# Patient Record
Sex: MALE | Race: WHITE | NOT HISPANIC OR LATINO | Employment: PART TIME | URBAN - NONMETROPOLITAN AREA
[De-identification: names, ages, dates, MRNs, and addresses within clinical notes are randomized per-mention and may not be internally consistent; named-entity substitution may affect disease eponyms.]

---
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
## 2021-01-06 DIAGNOSIS — F90.9 ATTENTION DEFICIT HYPERACTIVITY DISORDER (ADHD), UNSPECIFIED ADHD TYPE: Primary | ICD-10-CM

## 2021-01-06 RX ORDER — DEXTROAMPHETAMINE SACCHARATE, AMPHETAMINE ASPARTATE, DEXTROAMPHETAMINE SULFATE AND AMPHETAMINE SULFATE 2.5; 2.5; 2.5; 2.5 MG/1; MG/1; MG/1; MG/1
10 TABLET ORAL DAILY
Qty: 30 TABLET | Refills: 0 | Status: CANCELLED | OUTPATIENT
Start: 2021-01-06

## 2021-01-06 RX ORDER — DEXTROAMPHETAMINE SACCHARATE, AMPHETAMINE ASPARTATE, DEXTROAMPHETAMINE SULFATE AND AMPHETAMINE SULFATE 2.5; 2.5; 2.5; 2.5 MG/1; MG/1; MG/1; MG/1
10 TABLET ORAL DAILY
Qty: 30 TABLET | Refills: 0 | Status: SHIPPED | OUTPATIENT
Start: 2021-01-06 | End: 2021-01-11 | Stop reason: SDUPTHER

## 2021-01-06 RX ORDER — DEXTROAMPHETAMINE SACCHARATE, AMPHETAMINE ASPARTATE, DEXTROAMPHETAMINE SULFATE AND AMPHETAMINE SULFATE 2.5; 2.5; 2.5; 2.5 MG/1; MG/1; MG/1; MG/1
10 TABLET ORAL DAILY
COMMUNITY
End: 2021-01-06 | Stop reason: SDUPTHER

## 2021-01-06 NOTE — ADDENDUM NOTE
Addended by: MORALES VAZQUEZ on: 1/6/2021 04:56 PM     Modules accepted: Orders     This is a chronic health problem that is uncontrolled with current medications and lifestyle measures.  This patient had been working with the fertility doctors trying to achieve a pregnancy because her  has no children of his own and she has 2 daughters.  They tried 3 different times and were not able to achieve a pregnancy.  Patient is not planning to continue in that process.  We need to check her thyroid studies because it has been a year since she had her last ones done.  She is on levothyroxine 50 mcg daily.  We want to be certain this is adequate and controlling her thyroid replacement.

## 2021-01-11 DIAGNOSIS — F90.9 ATTENTION DEFICIT HYPERACTIVITY DISORDER (ADHD), UNSPECIFIED ADHD TYPE: ICD-10-CM

## 2021-01-11 RX ORDER — DEXTROAMPHETAMINE SACCHARATE, AMPHETAMINE ASPARTATE, DEXTROAMPHETAMINE SULFATE AND AMPHETAMINE SULFATE 2.5; 2.5; 2.5; 2.5 MG/1; MG/1; MG/1; MG/1
10 TABLET ORAL DAILY
Qty: 30 TABLET | Refills: 0 | Status: SHIPPED | OUTPATIENT
Start: 2021-01-11 | End: 2021-09-03

## 2021-02-05 DIAGNOSIS — K21.9 GASTRO-ESOPHAGEAL REFLUX DISEASE WITHOUT ESOPHAGITIS: ICD-10-CM

## 2021-02-05 RX ORDER — OMEPRAZOLE 20 MG/1
CAPSULE, DELAYED RELEASE ORAL
Qty: 30 CAPSULE | Refills: 2 | Status: SHIPPED | OUTPATIENT
Start: 2021-02-05 | End: 2021-05-24

## 2021-03-01 ENCOUNTER — TELEPHONE (OUTPATIENT)
Dept: FAMILY MEDICINE CLINIC | Facility: CLINIC | Age: 21
End: 2021-03-01

## 2021-03-01 ENCOUNTER — OFFICE VISIT (OUTPATIENT)
Dept: FAMILY MEDICINE CLINIC | Facility: CLINIC | Age: 21
End: 2021-03-01

## 2021-03-01 ENCOUNTER — LAB (OUTPATIENT)
Dept: LAB | Facility: HOSPITAL | Age: 21
End: 2021-03-01

## 2021-03-01 VITALS
SYSTOLIC BLOOD PRESSURE: 111 MMHG | BODY MASS INDEX: 22.51 KG/M2 | TEMPERATURE: 98 F | DIASTOLIC BLOOD PRESSURE: 80 MMHG | HEART RATE: 71 BPM | OXYGEN SATURATION: 98 % | WEIGHT: 152 LBS | HEIGHT: 69 IN

## 2021-03-01 DIAGNOSIS — Z79.899 MEDICATION MANAGEMENT: ICD-10-CM

## 2021-03-01 DIAGNOSIS — F90.2 ATTENTION DEFICIT HYPERACTIVITY DISORDER (ADHD), COMBINED TYPE: ICD-10-CM

## 2021-03-01 DIAGNOSIS — F90.2 ATTENTION DEFICIT HYPERACTIVITY DISORDER (ADHD), COMBINED TYPE: Primary | ICD-10-CM

## 2021-03-01 DIAGNOSIS — Z79.899 MEDICATION MANAGEMENT: Primary | ICD-10-CM

## 2021-03-01 DIAGNOSIS — F39 MOOD DISORDER (HCC): ICD-10-CM

## 2021-03-01 LAB
AMPHET+METHAMPHET UR QL: NEGATIVE
AMPHETAMINES UR QL: NEGATIVE
BARBITURATES UR QL SCN: NEGATIVE
BENZODIAZ UR QL SCN: NEGATIVE
BUPRENORPHINE SERPL-MCNC: NEGATIVE NG/ML
CANNABINOIDS SERPL QL: POSITIVE
COCAINE UR QL: NEGATIVE
METHADONE UR QL SCN: NEGATIVE
OPIATES UR QL: NEGATIVE
OXYCODONE UR QL SCN: NEGATIVE
PCP UR QL SCN: NEGATIVE
PROPOXYPH UR QL: NEGATIVE
TRICYCLICS UR QL SCN: NEGATIVE

## 2021-03-01 PROCEDURE — 80306 DRUG TEST PRSMV INSTRMNT: CPT

## 2021-03-01 PROCEDURE — 99214 OFFICE O/P EST MOD 30 MIN: CPT | Performed by: NURSE PRACTITIONER

## 2021-03-01 RX ORDER — LAMOTRIGINE 25 MG/1
25 TABLET ORAL DAILY
COMMUNITY
End: 2021-09-03 | Stop reason: SDUPTHER

## 2021-03-01 NOTE — TELEPHONE ENCOUNTER
----- Message from PIERRE Rouse sent at 3/1/2021 11:26 AM CST -----  He is unfortunately positive for THC. Must have clean UDS before meds sent in

## 2021-03-01 NOTE — PROGRESS NOTES
Chief Complaint  ADHD (3 month f/u medication management)    Subjective    History of Present Illness      Patient presents to [facility] for   ADHD/Mood HPI    Visit for:  follow-up. Most recent visit was 3 months ago.  Interim changes to follow up on today: Patient wants to see about getting Vyvanse changed to something on his insurance formulary, he was not able to get Vyvanse last month}  Work/School Performance:  Patient struggled last month due to not having Vyvanse.  Cognitive:  unable to focus    Behavior  Hyperactivity: is not hyperactive  Impulsivity: no impulsivity  Tasking: difficulty initiating tasks    Social  ADHD social/impulsive symptoms:  not impatient    Behavioral health  Behavior: no concerns  Emotional coping: demonstrates feelings of no concerns         Review of Systems   Constitutional: Negative for appetite change, diaphoresis, fatigue and fever.   HENT: Negative for ear pain, hearing loss, mouth sores, sinus pressure, sneezing, sore throat and voice change.    Eyes: Negative for discharge, itching and visual disturbance.   Respiratory: Negative for cough, shortness of breath and wheezing.    Cardiovascular: Negative for chest pain and palpitations.   Gastrointestinal: Negative for abdominal pain, diarrhea and vomiting.   Musculoskeletal: Negative for arthralgias, back pain and myalgias.   Skin: Negative for rash and bruise.   Neurological: Negative for dizziness, seizures, weakness, numbness and headache.   Hematological: Negative for adenopathy. Does not bruise/bleed easily.   Psychiatric/Behavioral: Negative for agitation, dysphoric mood, sleep disturbance and depressed mood. The patient is not nervous/anxious.        I have reviewed and agree with the HPI and ROS information as above.  Marilu Niño, APRN     Objective   Vital Signs:   /80 (BP Location: Left arm, Patient Position: Sitting)   Pulse 71   Temp 98 °F (36.7 °C)   Ht 175.3 cm  "(69\")   Wt 68.9 kg (152 lb)   SpO2 98%   BMI 22.45 kg/m²       Physical Exam  Vitals signs and nursing note reviewed.   Constitutional:       Appearance: Normal appearance. He is well-developed.   HENT:      Head: Normocephalic and atraumatic.      Right Ear: Tympanic membrane, ear canal and external ear normal.      Left Ear: Tympanic membrane, ear canal and external ear normal.      Nose: Nose normal. No septal deviation, nasal tenderness or congestion.      Mouth/Throat:      Lips: Pink. No lesions.      Mouth: Mucous membranes are moist. No oral lesions.      Dentition: Normal dentition.      Pharynx: Oropharynx is clear. No pharyngeal swelling, oropharyngeal exudate or posterior oropharyngeal erythema.   Eyes:      General: Lids are normal. Vision grossly intact. No scleral icterus.        Right eye: No discharge.         Left eye: No discharge.      Extraocular Movements: Extraocular movements intact.      Conjunctiva/sclera: Conjunctivae normal.      Right eye: Right conjunctiva is not injected.      Left eye: Left conjunctiva is not injected.      Pupils: Pupils are equal, round, and reactive to light.   Neck:      Musculoskeletal: Full passive range of motion without pain, normal range of motion and neck supple.      Thyroid: No thyroid mass.      Trachea: Trachea normal.   Cardiovascular:      Rate and Rhythm: Normal rate and regular rhythm.      Heart sounds: Normal heart sounds. No murmur. No gallop.    Pulmonary:      Effort: Pulmonary effort is normal.      Breath sounds: Normal breath sounds and air entry. No wheezing, rhonchi or rales.   Musculoskeletal: Normal range of motion.         General: No tenderness or deformity.      Thoracic back: Normal.      Right lower leg: No edema.      Left lower leg: No edema.   Skin:     General: Skin is warm and dry.      Coloration: Skin is not jaundiced.      Findings: No rash.   Neurological:      Mental Status: He is alert and oriented to person, place, and " "time.      Cranial Nerves: Cranial nerves are intact.      Sensory: Sensation is intact.      Motor: Motor function is intact.      Coordination: Coordination is intact.      Gait: Gait is intact.      Deep Tendon Reflexes: Reflexes are normal and symmetric.   Psychiatric:         Mood and Affect: Mood and affect normal.         Behavior: Behavior normal.         Judgment: Judgment normal.                      Assessment and Plan    Patient's Body mass index is 22.45 kg/m². BMI is within normal parameters. No follow-up required..    Problem List Items Addressed This Visit        Mental Health    ADHD (attention deficit hyperactivity disorder)    Current Assessment & Plan     Patient states that he is doing well on his medication. He is concerned because his insurance will not cover his Vyvanse. He states that they will not cover \"any mental health issues.\" Patient assistance provided to the patient along with a medical necessity letter. UDS completed today.          Mood disorder (CMS/HCC)    Current Assessment & Plan     Stable. Cont meds.            Other Visit Diagnoses     Medication management    -  Primary    Relevant Orders    Urine Drug Screen - Urine, Clean Catch            Follow Up   Return in about 3 months (around 6/1/2021) for ADHD follow up.  Patient was given instructions and counseling regarding his condition or for health maintenance advice. Please see specific information pulled into the AVS if appropriate.     ADHD Follow up:    Chuck report initiated in office and pending. ADRS (Adult ADHD Assessment Form) reviewed in detail, scanned in chart, and has been reviewed at time of appointment.  All questions, including medication and side effects, were discussed in detail at time of patient's visit. Patient will continue same medication which was discussed at today's visit. Patient is to return in 3 month(s).      "

## 2021-03-01 NOTE — ASSESSMENT & PLAN NOTE
"Patient states that he is doing well on his medication. He is concerned because his insurance will not cover his Vyvanse. He states that they will not cover \"any mental health issues.\" Patient assistance provided to the patient along with a medical necessity letter. UDS completed today.   "

## 2021-05-22 DIAGNOSIS — K21.9 GASTRO-ESOPHAGEAL REFLUX DISEASE WITHOUT ESOPHAGITIS: ICD-10-CM

## 2021-05-24 RX ORDER — OMEPRAZOLE 20 MG/1
CAPSULE, DELAYED RELEASE ORAL
Qty: 30 CAPSULE | Refills: 2 | Status: SHIPPED | OUTPATIENT
Start: 2021-05-24

## 2021-06-03 ENCOUNTER — TELEPHONE (OUTPATIENT)
Dept: FAMILY MEDICINE CLINIC | Facility: CLINIC | Age: 21
End: 2021-06-03

## 2021-06-03 ENCOUNTER — LAB (OUTPATIENT)
Dept: LAB | Facility: HOSPITAL | Age: 21
End: 2021-06-03

## 2021-06-03 DIAGNOSIS — F90.2 ATTENTION DEFICIT HYPERACTIVITY DISORDER (ADHD), COMBINED TYPE: Primary | ICD-10-CM

## 2021-06-03 DIAGNOSIS — F90.2 ATTENTION DEFICIT HYPERACTIVITY DISORDER (ADHD), COMBINED TYPE: ICD-10-CM

## 2021-06-03 PROCEDURE — 80306 DRUG TEST PRSMV INSTRMNT: CPT

## 2021-09-03 ENCOUNTER — OFFICE VISIT (OUTPATIENT)
Dept: FAMILY MEDICINE CLINIC | Facility: CLINIC | Age: 21
End: 2021-09-03

## 2021-09-03 ENCOUNTER — LAB (OUTPATIENT)
Dept: LAB | Facility: HOSPITAL | Age: 21
End: 2021-09-03

## 2021-09-03 VITALS
BODY MASS INDEX: 21.18 KG/M2 | TEMPERATURE: 98.7 F | HEART RATE: 66 BPM | RESPIRATION RATE: 20 BRPM | DIASTOLIC BLOOD PRESSURE: 80 MMHG | SYSTOLIC BLOOD PRESSURE: 120 MMHG | WEIGHT: 143 LBS | HEIGHT: 69 IN

## 2021-09-03 DIAGNOSIS — F90.2 ATTENTION DEFICIT HYPERACTIVITY DISORDER (ADHD), COMBINED TYPE: Primary | ICD-10-CM

## 2021-09-03 DIAGNOSIS — F41.9 ANXIETY: Primary | ICD-10-CM

## 2021-09-03 DIAGNOSIS — F90.2 ATTENTION DEFICIT HYPERACTIVITY DISORDER (ADHD), COMBINED TYPE: ICD-10-CM

## 2021-09-03 DIAGNOSIS — F39 MOOD DISORDER (HCC): ICD-10-CM

## 2021-09-03 LAB
AMPHET+METHAMPHET UR QL: NEGATIVE
AMPHETAMINES UR QL: NEGATIVE
BARBITURATES UR QL SCN: NEGATIVE
BENZODIAZ UR QL SCN: NEGATIVE
BUPRENORPHINE SERPL-MCNC: NEGATIVE NG/ML
CANNABINOIDS SERPL QL: NEGATIVE
COCAINE UR QL: NEGATIVE
METHADONE UR QL SCN: NEGATIVE
OPIATES UR QL: NEGATIVE
OXYCODONE UR QL SCN: NEGATIVE
PCP UR QL SCN: NEGATIVE
PROPOXYPH UR QL: NEGATIVE
TRICYCLICS UR QL SCN: NEGATIVE

## 2021-09-03 PROCEDURE — 99214 OFFICE O/P EST MOD 30 MIN: CPT | Performed by: NURSE PRACTITIONER

## 2021-09-03 PROCEDURE — 80306 DRUG TEST PRSMV INSTRMNT: CPT

## 2021-09-03 RX ORDER — LAMOTRIGINE 25 MG/1
25 TABLET ORAL DAILY
Qty: 30 TABLET | Refills: 2 | Status: SHIPPED | OUTPATIENT
Start: 2021-09-03

## 2021-09-03 RX ORDER — BUSPIRONE HYDROCHLORIDE 15 MG/1
15 TABLET ORAL 3 TIMES DAILY PRN
Qty: 90 TABLET | Refills: 2 | Status: SHIPPED | OUTPATIENT
Start: 2021-09-03

## 2021-09-03 NOTE — PROGRESS NOTES
"Chief Complaint  f/u ADHD, f/u mood disorder, and Anxiety    Subjective    History of Present Illness      Patient presents to [facility] for   Pt states that he is here for a f/u ADHD and mood disorder. PT states that his mood is stable and he is having some increased anxiety. 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      ADHD/Mood HPI    Visit for:  follow-up. Most recent visit was 6 months ago.  Interim changes to follow up on today: new medication:   Work/School Performance:  struggling  Cognitive:  unable to focus    Behavior  Hyperactivity: is not hyperactive  Impulsivity: no impulsivity  Tasking: difficulty initiating tasks and difficulty completing tasks    Social  ADHD social/impulsive symptoms:  not impatient    Behavioral health  Behavior: no concerns  Emotional coping: demonstrates feelings of no concerns    Anxiety  Presents for initial visit. Onset was 1 to 6 months ago. The problem has been gradually worsening. Symptoms include nervous/anxious behavior. The quality of sleep is fair.            Review of Systems   Psychiatric/Behavioral: The patient is nervous/anxious.        I have reviewed and agree with the HPI and ROS information as above.  PIERRE Carbajal     Objective   Vital Signs:   /80   Pulse 66   Temp 98.7 °F (37.1 °C)   Resp 20   Ht 175.3 cm (69\")   Wt 64.9 kg (143 lb)   BMI 21.12 kg/m²       Physical Exam  Constitutional:       Appearance: Normal appearance. He is well-developed.   HENT:      Head: Normocephalic and atraumatic.      Right Ear: External ear normal.      Left Ear: External ear normal.      Nose: Nose normal. No nasal tenderness or congestion.      Mouth/Throat:      Lips: Pink. No lesions.      Mouth: Mucous membranes are moist. No oral lesions.      Dentition: Normal dentition.      Pharynx: Oropharynx is clear. No pharyngeal " swelling, oropharyngeal exudate or posterior oropharyngeal erythema.   Eyes:      General: Lids are normal. Vision grossly intact. No scleral icterus.        Right eye: No discharge.         Left eye: No discharge.      Extraocular Movements: Extraocular movements intact.      Conjunctiva/sclera: Conjunctivae normal.      Right eye: Right conjunctiva is not injected.      Left eye: Left conjunctiva is not injected.      Pupils: Pupils are equal, round, and reactive to light.   Cardiovascular:      Rate and Rhythm: Normal rate and regular rhythm.      Heart sounds: Normal heart sounds. No murmur heard.   No gallop.    Pulmonary:      Effort: Pulmonary effort is normal.      Breath sounds: Normal breath sounds and air entry. No wheezing, rhonchi or rales.   Musculoskeletal:         General: No tenderness or deformity. Normal range of motion.      Cervical back: Full passive range of motion without pain, normal range of motion and neck supple.      Right lower leg: No edema.      Left lower leg: No edema.   Skin:     General: Skin is warm and dry.      Coloration: Skin is not jaundiced.      Findings: No rash.   Neurological:      Mental Status: He is alert and oriented to person, place, and time.      Cranial Nerves: Cranial nerves are intact.      Sensory: Sensation is intact.      Motor: Motor function is intact.      Coordination: Coordination is intact.      Gait: Gait is intact.   Psychiatric:         Attention and Perception: Attention normal.         Mood and Affect: Mood and affect normal.         Behavior: Behavior is not hyperactive. Behavior is cooperative.         Thought Content: Thought content normal.         Judgment: Judgment normal.          Result Review  Data Reviewed:                   Assessment and Plan      Problem List Items Addressed This Visit        Mental Health    ADHD (attention deficit hyperactivity disorder)    Relevant Medications    busPIRone (BUSPAR) 15 MG tablet    Other Relevant  Orders    Urine Drug Screen - Urine, Clean Catch (Completed)    Mood disorder (CMS/HCC)    Relevant Medications    lamoTRIgine (LaMICtal) 25 MG tablet    busPIRone (BUSPAR) 15 MG tablet      Other Visit Diagnoses     Anxiety    -  Primary    Relevant Medications    busPIRone (BUSPAR) 15 MG tablet      Patient comes in today to follow-up on ADHD and mood disorder.  He states that his mood is stable.  Does have some anxiety but is just requesting as needed BuSpar for that.  He is requesting to get back on ADD medication as he has now started pharmacy school.  We will get a drug screen today.  As long as that is clean then patient wishes to go back to Vyvanse 50 mg.  He is given his portion of the dictated paperwork to complete. Okay to follow-up in 3 months if he is doing well.  As per is pending.        Follow Up   Return in about 3 months (around 12/3/2021).  Patient was given instructions and counseling regarding his condition or for health maintenance advice. Please see specific information pulled into the AVS if appropriate.

## 2021-11-08 DIAGNOSIS — F90.2 ATTENTION DEFICIT HYPERACTIVITY DISORDER (ADHD), COMBINED TYPE: ICD-10-CM

## 2021-11-08 NOTE — TELEPHONE ENCOUNTER
Caller: Kyler Koch    Relationship: Self    Requested Prescriptions:   Requested Prescriptions     Pending Prescriptions Disp Refills   • lisdexamfetamine (Vyvanse) 50 MG capsule 30 capsule 0     Sig: Take 1 capsule by mouth Every Morning for 30 days        Pharmacy where request should be sent:[facility]/pharmacy #[id number] - [place], [place] - [address] - [phone] [facility] [phone]    Best call back number:[phone]    Does the patient have less than a 3 day supply:  [x] Yes  [] No    Hao Maldonado Rep   11/08/21 13:22 CST

## 2021-11-08 NOTE — TELEPHONE ENCOUNTER
Pt last seen 9/3/21 and to return in 3 mo. Pt due for 3rd. Will route to Dr. Reynoso to review and sign. Contacted pt, verified name and , advised routing and verified pref pharm.

## 2022-05-25 DIAGNOSIS — F41.9 ANXIETY: ICD-10-CM

## 2022-05-26 RX ORDER — BUSPIRONE HYDROCHLORIDE 15 MG/1
15 TABLET ORAL 3 TIMES DAILY PRN
Qty: 90 TABLET | Refills: 2 | OUTPATIENT
Start: 2022-05-26

## 2022-08-22 ENCOUNTER — TELEPHONE (OUTPATIENT)
Dept: FAMILY MEDICINE CLINIC | Age: 22
End: 2022-08-22

## 2022-08-22 NOTE — TELEPHONE ENCOUNTER
LM for Pt stating Dr. Beltran Torres is not taking New Patients and informed of the providers in office who are.

## 2022-08-22 NOTE — TELEPHONE ENCOUNTER
----- Message from Yulisa Campoverde sent at 8/19/2022  2:47 PM CDT -----  Subject: Appointment Request    Reason for Call: New Patient/New to Provider Appointment needed: New   Patient Request Appointment    QUESTIONS    Reason for appointment request? No appointments available during search     Additional Information for Provider? Keyon Constantino is interested in scheduling a   new patient appointment with Maria D Barefoot. Please reach out to [address] [place] [address]. Irasmea Lee   ---------------------------------------------------------------------------  --------------  Nancy DRAKE  [phone]; OK to leave message on voicemail  ---------------------------------------------------------------------------  --------------  SCRIPT CHAR KARIMIID Screen: Casandra Feldman

## 2024-03-05 ENCOUNTER — TELEPHONE (OUTPATIENT)
Dept: OTOLARYNGOLOGY | Facility: CLINIC | Age: 24
End: 2024-03-05
Payer: COMMERCIAL

## 2024-03-21 ENCOUNTER — TELEPHONE (OUTPATIENT)
Dept: OTOLARYNGOLOGY | Facility: CLINIC | Age: 24
End: 2024-03-21
Payer: COMMERCIAL

## 2024-03-28 ENCOUNTER — TELEPHONE (OUTPATIENT)
Dept: OTOLARYNGOLOGY | Facility: CLINIC | Age: 24
End: 2024-03-28
Payer: COMMERCIAL

## 2024-04-04 ENCOUNTER — TELEPHONE (OUTPATIENT)
Dept: OTOLARYNGOLOGY | Facility: CLINIC | Age: 24
End: 2024-04-04
Payer: COMMERCIAL

## 2024-04-04 NOTE — TELEPHONE ENCOUNTER
Hub staff attempted to follow warm transfer process and was unsuccessful     Caller: Kyler Koch     Relationship to patient: SELF    Best call back number: [phone]     Patient is needing: PT HAD AN APPT BUT IT WAS CANCELLED DUE TO DIANDRA BEING OUT OF THE OFFICE. PT THOUGHT ANOTHER APPT HAD BEEN SCHEDULED BUT NOTHING FOUND IN CHART.    PT IS A STUDENT AND IS HOME FOR THE MONTH OF APRIL AND WILL BE GONE AGAIN IN MAY AND JUNE.    PT'S LEFT EAR IS DRAINING AND HE HAS CONTINUOUS NOSE BLEEDS. PLEASE GIVE PT A CALL TO RESCHEDULE APPT. NOTHING AVAILABLE UNTIL JUNE.     PT HAS A NEW PHONE NUMBER AND THAT HAS BEEN UPDATED IN HIS CHART.

## 2024-04-09 ENCOUNTER — OFFICE VISIT (OUTPATIENT)
Dept: OTOLARYNGOLOGY | Facility: CLINIC | Age: 24
End: 2024-04-09
Payer: COMMERCIAL

## 2024-04-09 VITALS — BODY MASS INDEX: 23.7 KG/M2 | TEMPERATURE: 97.8 F | HEIGHT: 69 IN | WEIGHT: 160 LBS

## 2024-04-09 DIAGNOSIS — H66.92 LEFT CHRONIC OTITIS MEDIA: ICD-10-CM

## 2024-04-09 DIAGNOSIS — R04.0 EPISTAXIS: Primary | ICD-10-CM

## 2024-04-09 RX ORDER — DEXTROAMPHETAMINE SULFATE, DEXTROAMPHETAMINE SACCHARATE, AMPHETAMINE SULFATE AND AMPHETAMINE ASPARTATE 5; 5; 5; 5 MG/1; MG/1; MG/1; MG/1
1 CAPSULE, EXTENDED RELEASE ORAL DAILY
COMMUNITY

## 2024-04-09 RX ORDER — DEXTROAMPHETAMINE SACCHARATE, AMPHETAMINE ASPARTATE, DEXTROAMPHETAMINE SULFATE AND AMPHETAMINE SULFATE 1.25; 1.25; 1.25; 1.25 MG/1; MG/1; MG/1; MG/1
1 TABLET ORAL DAILY
COMMUNITY

## 2024-04-09 RX ORDER — CIPROFLOXACIN AND DEXAMETHASONE 3; 1 MG/ML; MG/ML
3 SUSPENSION/ DROPS AURICULAR (OTIC) 2 TIMES DAILY
Qty: 7.5 ML | Refills: 0 | Status: SHIPPED | OUTPATIENT
Start: 2024-04-09 | End: 2024-04-16

## 2024-04-09 RX ORDER — ESCITALOPRAM OXALATE 10 MG/1
1 TABLET ORAL DAILY
COMMUNITY

## 2024-04-09 RX ORDER — AMOXICILLIN AND CLAVULANATE POTASSIUM 875; 125 MG/1; MG/1
1 TABLET, FILM COATED ORAL EVERY 12 HOURS SCHEDULED
Qty: 20 TABLET | Refills: 0 | Status: SHIPPED | OUTPATIENT
Start: 2024-04-09 | End: 2024-04-19

## 2024-04-09 NOTE — PROGRESS NOTES
PIERRE Wu  [facility]  [address]  [place] [address]  Fax [phone]  Phone [phone]      Visit Type: NEW PATIENT   Chief Complaint   Patient presents with    Ear Drainage    Nose Bleed           HPI  He complains of ear pressure and decreased hearing, epistaxis. The symptoms are localized to the left side. The patient has had moderate symptoms. The symptoms have been relatively constant for the last several years. There have been no identified factors that aggravate the symptoms. There have been no factors that have improved the symptoms.    He uses saline nasal spray.    He reports sinus congestion on the left side as well.    He has had cautery performed by Dr. Briceño in the past.     Past Medical History:   Diagnosis Date    ADHD (attention deficit hyperactivity disorder)     Anxiety        Past Surgical History:   Procedure Laterality Date    ORIF WRIST FRACTURE Left     4       Family History: His family history includes No Known Problems in his father, mother, and sister.     Social History: He  reports that he has never smoked. He has never used smokeless tobacco. He reports that he does not currently use alcohol. He reports that he does not use drugs.    Home Medications:  Loratadine, amoxicillin-clavulanate, amphetamine-dextroamphetamine, amphetamine-dextroamphetamine XR, ciprofloxacin-dexAMETHasone, escitalopram, and mupirocin    Allergies:  He has No Known Allergies.       Vital Signs:   Temp:  [97.8 °F (36.6 °C)] 97.8 °F (36.6 °C)  ENT Physical Exam  Constitutional  Appearance: patient appears well-developed, well-nourished and well-groomed,  Communication/Voice: communication appropriate for developmental age; vocal quality normal;  Head and Face  Appearance: head appears normal, face appears normal and face appears atraumatic;  Palpation: facial palpation normal;  Salivary: glands  normal;  Ear  Hearing: intact;  Auricles: bilateral auricles normal;  Ear Canals: bilateral ear canals normal;  Tympanic Membranes: left tympanic membrane abnormal and retracted; with retraction pocket;  Nose  Internal Nose: bilateral inferior turbinates erythematous;  Nose comments: Left septum excoriated  Oral Cavity/Oropharynx  Lips: normal;  Teeth: normal;  Gums: gingiva normal;  Tongue: normal;  Oral mucosa: normal;  Hard palate: normal;  Neck  Neck: neck normal;  Respiratory  Inspection: breathing unlabored; normal breathing rate;  Cardiovascular  Inspection: extremities are warm and well perfused;  Lymphatic  Palpation: lymph nodes normal;           Result Review       RESULTS REVIEW    I have reviewed the patients old records in the chart.        Assessment & Plan  Epistaxis    Left chronic otitis media       Orders Placed This Encounter   Procedures    Comprehensive Hearing Test      New Medications Ordered This Visit   Medications    amoxicillin-clavulanate (AUGMENTIN) 875-125 MG per tablet     Sig: Take 1 tablet by mouth Every 12 (Twelve) Hours for 10 days.     Dispense:  20 tablet     Refill:  0    ciprofloxacin-dexAMETHasone (CIPRODEX) 0.3-0.1 % otic suspension     Sig: Administer 3 drops into the left ear 2 (Two) Times a Day for 7 days.     Dispense:  7.5 mL     Refill:  0    mupirocin (BACTROBAN) 2 % ointment     Sig: Apply 1 Application topically to the appropriate area as directed 2 (Two) Times a Day for 14 days.     Dispense:  28 g     Refill:  0     Call for ear problems, especially change of hearing, ear pain or dizziness.  If not improved, will consider myringotomy tube insertion on follow up.  We will follow retraction for now. Call for drainage, pain or worsening hearing. If worsening or not improving, may need to consider more aggressive approach.  Saline nasal spray or saline gel to nose three times a day and as needed. Use a bedside humidifier at night. Place Vasoline in nose in the morning  and at night.  Use antibiotic ointment in the front of the nose twice a day for 2 weeks. Then, switch to vasoline in the nose twice a day to keep the lining moist.  NOSEBLEED INSTRUCTIONS: Use over the counter antibiotic ointment or Vasoline to anterior nares twice a day. Salt water spray or gel to nose every 2 hours and as needed. Hypertension control is important, keeping systolic pressures less than 140 is possible. If epistaxis present, hold all ASA or NSAIDs. Use Afrin or Neosynephrine spray if epistaxis returns, Hold direct pressure to the fleshy portion of the nose for five minutes. If epistaxis continues, repeat and hold pressure for another five minutes. If bleeding continues, call the office or go to the emergency room for evaluation.   Return in about 6 weeks (around 5/21/2024) for Follow up with PIERRE Wu, with audiogram.        Electronically signed by PIERRE Wu 04/09/24 10:41 AM CDT.

## 2024-05-22 ENCOUNTER — PROCEDURE VISIT (OUTPATIENT)
Dept: OTOLARYNGOLOGY | Facility: CLINIC | Age: 24
End: 2024-05-22
Payer: COMMERCIAL

## 2024-05-22 ENCOUNTER — OFFICE VISIT (OUTPATIENT)
Dept: OTOLARYNGOLOGY | Facility: CLINIC | Age: 24
End: 2024-05-22
Payer: COMMERCIAL

## 2024-05-22 VITALS
WEIGHT: 167 LBS | BODY MASS INDEX: 24.73 KG/M2 | TEMPERATURE: 98.1 F | SYSTOLIC BLOOD PRESSURE: 122 MMHG | HEIGHT: 69 IN | DIASTOLIC BLOOD PRESSURE: 78 MMHG | RESPIRATION RATE: 18 BRPM

## 2024-05-22 DIAGNOSIS — H69.92 EUSTACHIAN TUBE DYSFUNCTION, LEFT: Primary | ICD-10-CM

## 2024-05-22 DIAGNOSIS — H73.892 RETRACTION OF TYMPANIC MEMBRANE OF LEFT EAR: ICD-10-CM

## 2024-05-22 DIAGNOSIS — H90.12 CONDUCTIVE HEARING LOSS OF LEFT EAR WITH UNRESTRICTED HEARING OF RIGHT EAR: Primary | ICD-10-CM

## 2024-05-22 NOTE — PROGRESS NOTES
AUDIOMETRIC EVALUATION      Name:  Kyler Koch  :  2000  Age:  23 y.o.  Date of Evaluation:  2024       History:  Mr. Koch is seen today for a hearing evaluation due to ear drainage at the request of PIERRE Wu.    Audiologic Information:  Concerns for Hearing: Bilateral  PETs: Bilateral as a child  Other otologic surgical history: No  Aural Pressure/Fullness: Left  Otalgia: Left  Otorrhea: Left  Tinnitus: No  Dizziness: No  Noise Exposure: No  Family history of hearing loss: Father  Head trauma requiring hospital stay: No  Chemotherapy: No  Other significant history: None    **Case history obtained in office and through EMR system      EVALUATION:        RESULTS:    Otoscopic Evaluation:  Right: drainage in canal, tympanic membrane visualized  Left: drainage in canal, tympanic membrane visualized    Tympanometry (226 Hz):  Right: Type A  Left: Type C    Pure Tone Audiometry:    Right: Normal hearing thresholds  Left: Mild (250Hz-500Hz) sloping to moderate (1000Hz) risinto moderate conductive hearing loss     Speech Audiometry:   Right: Speech Reception Threshold (SRT) was obtained at 15 dB HL  Word Recognition scores - Excellent (100)% at 55 dB, using NU-6 List 2A with 10 words  Left: Speech Reception Threshold (SRT) was obtained at 30 dB HL  Word Recognition scores - Excellent (100)% at 70 dB with 50 dB of contralateral masking, using NU-6 List 2A with 10 words  SRT/PTA in good agreement bilaterally.    IMPRESSIONS:  Tympanometry showed normal middle ear pressure and static compliance, consistent with normal middle ear function for the right ear. Tympanometry showed significant negative middle ear pressure in the presence of normal static compliance, consistent with Eustachian Tube Dysfunction or middle ear pathology, for the left ear. Pure tone thresholds for the right ear show normal hearing, suggesting normal outer/middle ear function and normal cochlear/retrocochlear function. Pure  tone thresholds for the right ear show conductive hearing loss, suggesting abnormal outer/middle ear function and normal cochlear/retrocochlear function. Patient was counseled with regard to the findings.      Diagnosis:  1. Conductive hearing loss of left ear with unrestricted hearing of right ear         RECOMMENDATIONS/PLAN:  Follow-up recommendations per PIERRE Wu.  Repeat hearing evaluation after medical intervention.  Discussed results and recommendations with patient. Questions were addressed and the patient was encouraged to contact our department should concerns arise.        Esther Marc, CCC-A, F-AAA  Doctor of Audiology

## 2024-05-22 NOTE — PROGRESS NOTES
PIERRE Wu  [facility] EAR NOSE & THROAT  [address], [address]  [place] [address]  Fax [phone]  Phone [phone]      Visit Type: FOLLOW UP   Chief Complaint   Patient presents with    Ear Problem           HPI  He presents for a follow up evaluation. He has had continued problems with ear fullness, ear pressure, and muffled hearing. The symptoms are localized to the left ear. The symptoms severity was described as : moderate The symptoms have been : relatively constant for the last several months There have been no identified factors that aggravate the symptoms. The patient has been treated with antihistamines and intranasal fluticasone in the past with continued symptoms.      Past Medical History:   Diagnosis Date    ADHD (attention deficit hyperactivity disorder)     Anxiety        Past Surgical History:   Procedure Laterality Date    ORIF WRIST FRACTURE Left     4       Family History: His family history includes No Known Problems in his father, mother, and sister.     Social History: He  reports that he has never smoked. He has never used smokeless tobacco. He reports that he does not currently use alcohol. He reports that he does not use drugs.    Home Medications:  amphetamine-dextroamphetamine, amphetamine-dextroamphetamine XR, and escitalopram    Allergies:  He has No Known Allergies.       Vital Signs:   Temp:  [98.1 °F (36.7 °C)] 98.1 °F (36.7 °C)  Resp:  [18] 18  BP: (122)/(78) 122/78  ENT Physical Exam  Ear  Hearing: intact;  Auricles: right auricle normal;  External Mastoids: right external mastoid normal;  Ear Canals: right ear canal normal;  Tympanic Membranes: left tympanic membrane retracted; with retraction pocket and depth visible;           Result Review       RESULTS REVIEW    I have reviewed the patients old records in the chart.   The following results/records were reviewed:   Procedure visit with  Esther Acevedo, AUD (05/22/2024) conductive loss with type C left, normal right       Assessment & Plan  Eustachian tube dysfunction, left    Retraction of tympanic membrane of left ear              We will get him set up for a left tube with Dr. Bartholomew next week.  No follow-ups on file.        Electronically signed by PIERRE Wu 05/22/24 4:06 PM CDT.

## 2024-05-24 ENCOUNTER — TELEPHONE (OUTPATIENT)
Dept: OTOLARYNGOLOGY | Facility: CLINIC | Age: 24
End: 2024-05-24
Payer: COMMERCIAL

## 2024-05-24 NOTE — TELEPHONE ENCOUNTER
Hub staff attempted to follow warm transfer process and was unsuccessful     Caller: Kyler Koch    Relationship to patient: Self    Best call back number: [phone]    Patient is needing: PT MISSED PHONE CALL TO CALL BACK TO SCHEDULE. OFFICE PLEASE CALL PT BACK WITH THESE REGARDS.THANK YOU.

## 2024-05-24 NOTE — TELEPHONE ENCOUNTER
Left Vm for patient, want to schedule left myringotomy on May 28th, requested call back to set up a time.

## 2024-05-28 ENCOUNTER — TELEPHONE (OUTPATIENT)
Dept: OTOLARYNGOLOGY | Facility: CLINIC | Age: 24
End: 2024-05-28

## 2024-05-28 ENCOUNTER — TELEPHONE (OUTPATIENT)
Dept: OTOLARYNGOLOGY | Facility: CLINIC | Age: 24
End: 2024-05-28
Payer: COMMERCIAL

## 2024-05-28 NOTE — TELEPHONE ENCOUNTER
Returned patient call, we have been playing phone tag. Left VM to contact me to schedule his procedure

## 2024-05-28 NOTE — TELEPHONE ENCOUNTER
Caller: Kyler Koch    Relationship: Self    Best call back number: [phone]    What is the best time to reach you: ANY    Who are you requesting to speak with (clinical staff, provider,  specific staff member): CLINICAL    Do you know the name of the person who called: DISHA    What was the call regarding: PLEASE CALL TO SCHEDULE SURGERY.    Is it okay if the provider responds through MyChart: NA

## 2024-05-29 ENCOUNTER — TELEPHONE (OUTPATIENT)
Dept: OTOLARYNGOLOGY | Facility: CLINIC | Age: 24
End: 2024-05-29
Payer: COMMERCIAL

## 2024-05-29 NOTE — TELEPHONE ENCOUNTER
Playing phone tag with patient, left VM saying I was scheduling his IOP for 5-30-24 at 10:30, requested a call back saying this date was ok or not.     Pt called back to confirm date and time

## 2024-05-30 ENCOUNTER — PROCEDURE VISIT (OUTPATIENT)
Dept: OTOLARYNGOLOGY | Facility: CLINIC | Age: 24
End: 2024-05-30
Payer: COMMERCIAL

## 2024-05-30 VITALS
TEMPERATURE: 98.4 F | HEIGHT: 69 IN | BODY MASS INDEX: 24.73 KG/M2 | DIASTOLIC BLOOD PRESSURE: 89 MMHG | SYSTOLIC BLOOD PRESSURE: 128 MMHG | WEIGHT: 167 LBS | HEART RATE: 78 BPM

## 2024-05-30 DIAGNOSIS — H69.92 EUSTACHIAN TUBE DYSFUNCTION, LEFT: ICD-10-CM

## 2024-05-30 DIAGNOSIS — H73.892 RETRACTION OF TYMPANIC MEMBRANE OF LEFT EAR: ICD-10-CM

## 2024-05-30 DIAGNOSIS — H90.12 CONDUCTIVE HEARING LOSS OF LEFT EAR WITH UNRESTRICTED HEARING OF RIGHT EAR: Primary | ICD-10-CM

## 2024-05-30 NOTE — PROGRESS NOTES
Paul Bartholomew Jr, MD  [facility] EAR NOSE & THROAT  [address], [address]  [facility] [address]  Fax [phone]  Phone [phone]      Visit Type: IN OFFICE PROCEDURE   Chief Complaint   Patient presents with    IOP     Left myringotomy           HPI  Accompanied by:Wife  He presents for a follow up evaluation.  She was referred from nursing practitioner for evaluation and treatment of left retracted tympanic membrane.  Patient is notes no improvement in symptoms.  He wishes to proceed with left myringotomy, possible tube    Past Medical History:   Diagnosis Date    ADHD (attention deficit hyperactivity disorder)     Anxiety        Past Surgical History:   Procedure Laterality Date    ORIF WRIST FRACTURE Left     4       Family History: His family history includes No Known Problems in his father, mother, and sister.     Social History: He  reports that he has never smoked. He has never used smokeless tobacco. He reports that he does not currently use alcohol. He reports that he does not use drugs.    Home Medications:  amphetamine-dextroamphetamine, amphetamine-dextroamphetamine XR, and escitalopram    Allergies:  He has No Known Allergies.       Vital Signs:   Temp:  [98.4 °F (36.9 °C)] 98.4 °F (36.9 °C)  Heart Rate:  [78] 78  BP: (128)/(89) 128/89  ENT Physical Exam  Constitutional  Appearance: patient appears well-developed and well-nourished, patient is cooperative;  Communication/Voice: communication appropriate for developmental age; vocal quality normal;  Head and Face  Appearance: head appears normal, face appears normal and face appears atraumatic;  Palpation: facial palpation normal;  Salivary: glands normal;  Ear  Hearing: intact;  Auricles: right auricle normal; left auricle normal;  External Mastoids: right external mastoid normal; left external mastoid normal;  Ear Canals: Ear Canal comments: Too clean, scaly, no earwax  Tympanic  Membranes: right tympanic membrane abnormal; thickened; with myringosclerosis; left tympanic membrane abnormal and retracted (Moderate); not with effusion; thickened; with myringosclerosis; retracted pars tensa; Tympanic Membrane comments: Bilateral moderate myringosclerosis   Nose  External Nose: nares patent bilaterally; external nose normal;  Oral Cavity/Oropharynx  Lips: normal;  Teeth: normal;  Gums: gingiva normal;  Tongue: normal;  Oral mucosa: normal;  Hard palate: normal;  Neck  Neck: neck normal;  Respiratory  Inspection: breathing unlabored; normal breathing rate;  Cardiovascular  Inspection: extremities are warm and well perfused; no peripheral edema present;  Neurovestibular  Mental Status: alert and oriented;  Psychiatric: mood normal; affect is appropriate;       Myringotomy    Date/Time: 5/30/2024 10:52 AM    Performed by: Paul Bartholomew Jr., MD  Authorized by: Paul Bartholomew Jr., MD    Informed Consent:   Consent for the procedure was given by the patient. The risks and benefits of the procedure were discussed, including but not limited to bleeding, nerve damage, hearing loss/ tinnitus, tympanic membrane perforation, infection, anesthetic risk, otorrhea, vertigo, pain, scarring, aural fullness and early or late tube extrusion. Alternatives were discussed, including no treatment. After the discussion of the risks and benefits, questions were allowed and answered until understanding was demonstrated. Consent to perform the procedure was obtained by verbal consent consent.     Anesthesia (see MAR for exact dosages):     Anesthetic total (ml): Phenol 1 drop.    Indications:  Left myringotomy tube insertion was felt to be indicated for failed medical management and eustachian tube dysfunction.     Procedure:  The ear canal and tympanic membrane were visualized with the otologic microscope. A left myringotomy tube insertion was performed. The middle ear was inspected and noted to have normal  mucosa. After suctioning, a beveled Silva modified tube was then placed in the myringotomy site. There was noted to be no difficulty placing the tube. Ear drops: None. The procedure was tolerated well with no immediate complications.      Left ear-tympanic membrane shows thickening and myringosclerosis of the anterior tympanic membrane.  Posterior inferior myringotomy carried out without difficulty.  Tube placed in the incision and seated  Hemostasis satisfactory  Left ear symptoms improved after tube placement.     Result Review       RESULTS REVIEW    I have reviewed the patients old records in the chart.   Office Visit with Regla Villalobos APRN (05/22/2024)   Procedure visit with Esther Acevedo AUD (05/22/2024)          Assessment & Plan  Conductive hearing loss of left ear with unrestricted hearing of right ear    Eustachian tube dysfunction, left    Retraction of tympanic membrane of left ear     Diagnosis Plan   1. Conductive hearing loss of left ear with unrestricted hearing of right ear  Left Myringotomy Tube Insertion    Left-sided      2. Eustachian tube dysfunction, left  Left Myringotomy Tube Insertion    Contributing to retraction of left tympanic membrane and conductive hearing loss      3. Retraction of tympanic membrane of left ear  Left Myringotomy Tube Insertion    Moderate, no evidence of serous middle ear effusion on the left             Orders Placed This Encounter   Procedures    Left Myringotomy Tube Insertion         Medical and surgical options were discussed including observation, continued medical management, medication modification, and surgical management. Risks, benefits and alternatives were discussed and questions were answered. After considering the options, the patient decided to proceed with surgical management.  Patient has had left ear tube placed today.  He notes that his symptoms are improved.  Patient will return in 6 weeks.  He has been given water  precautions.  Plan audiogram in approximately 3 months if the patient is stable.    My Chart:  Patient is using My Chart    Patient, Spouse understand(s) and agree(s) with the treatment plan as described.    Return in about 6 weeks (around 7/11/2024) for Recheck Left ear.        Electronically signed by Paul Bartholomew Jr, MD 05/30/24 10:53 AM CDT.

## 2024-05-30 NOTE — ASSESSMENT & PLAN NOTE
Diagnosis Plan   1. Conductive hearing loss of left ear with unrestricted hearing of right ear  Left Myringotomy Tube Insertion    Left-sided      2. Eustachian tube dysfunction, left  Left Myringotomy Tube Insertion    Contributing to retraction of left tympanic membrane and conductive hearing loss      3. Retraction of tympanic membrane of left ear  Left Myringotomy Tube Insertion    Moderate, no evidence of serous middle ear effusion on the left

## 2024-05-30 NOTE — PATIENT INSTRUCTIONS
>NASAL SALINE:  Use 2 puffs each nostril 4-6 times daily and more frequently if possible.  You can buy saline spray or you can make your own and use an old spray bottle to administer  Use a humidifier at bedside  Recipe for saline:  Water                                 1 quart  Salt (table)                        1 tablespoon  Gylcerin (or Lory Syrup)    1 teaspoon  Sodium bicarbonate           1 teaspoon  Sprays or Cheney pots are recommended    Do not allow to stand for more than 24 hrs. Make new solution. There is no preservative in this solution.    Sinus irrigation and saline application can be enhanced with various over-the-counter products.  A WaterPik can be quite useful to irrigate, especially following sinus surgery.  Navage makes a product that irrigates the nose and some of the sinuses.  NeilMed makes a Sinu-Gator to irrigate the sinuses.  Neomed also has canned saline that we will come out under pressure.  A Evelyne pot can also be helpful.  All of these products help keep the nose clear of debris.  Please use as directed on the instructions that come with the particular device.       WATER PRECAUTIONS FOR EARS    Protecting your ears from water may sometimes be necessary for the health of your ears.     > Ear plugs: You may use earplugs: over the counter silicone plugs or a cotton ball coated with vasoline when bathing. If conservative measures are not working, consider obtaining molded earplugs from the audiology department to use while bathing or swimming.   Purchase inexpensive types that are most comfortable for you. You can make your own by using cotton balls mixed with a generous amount of Vaseline petroleum jelly. Gently place these in the ear canal.    > Dry the ear canal: after getting out of the shower or bath, use a hair dryer on low heat blowing in the ear for 10-15 seconds. Pulling gently backwards on the ear straightens the ear canal and allows the air to get further down.    > If you are  to use ear drops, please place them in the ear canal and give them a few seconds to run down.  Follow this by blowing in the ear canal with a hair dryer set on low heat for approximately 10 to 15 seconds.  You may do this multiple times during the day to help keep the ear canal dry.    NO Q TIPS    Call for problems    >If you are swimming frequently- place a drop of oil in each ear canal prior to entering water. After you are finished in the water, place a drop of vinegar in each ear canal. Use a hair dryer on low heat to blow in each ear canal for 10-15 seconds to dry ears out.        CONTACT INFORMATION:  The main office phone number is [phone]. For emergencies after hours and on weekends, this number will convert over to our answering service and the on call provider will answer. Please try to keep non emergent phone calls/ questions to office hours 9am-5pm Monday through Friday.     bluepulse  As an alternative, you can sign up and use the Epic MyChart system for more direct and quicker access for non emergent questions/ problems.  [facility] bluepulse allows you to send messages to your doctor, view your test results, renew your prescriptions, schedule appointments, and more. To sign up, go to [facility] and click on the Sign Up Now link in the New User? box. Enter your bluepulse Activation Code exactly as it appears below along with the last four digits of your Social Security Number and your Date of Birth () to complete the sign-up process. If you do not sign up before the expiration date, you must request a new code.    bluepulse Activation Code: Activation code not generated  Current bluepulse Status: Active    If you have questions, you can email [email or web address] or call [phone] to talk to our bluepulse staff. Remember, bluepulse is NOT to be used for urgent needs. For medical emergencies, dial 911.

## 2024-07-17 ENCOUNTER — OFFICE VISIT (OUTPATIENT)
Dept: OTOLARYNGOLOGY | Facility: CLINIC | Age: 24
End: 2024-07-17
Payer: COMMERCIAL

## 2024-07-17 VITALS — WEIGHT: 166 LBS | HEIGHT: 69 IN | TEMPERATURE: 98.6 F | BODY MASS INDEX: 24.59 KG/M2

## 2024-07-17 DIAGNOSIS — H90.12 CONDUCTIVE HEARING LOSS OF LEFT EAR WITH UNRESTRICTED HEARING OF RIGHT EAR: Primary | ICD-10-CM

## 2024-07-17 DIAGNOSIS — H69.92 EUSTACHIAN TUBE DYSFUNCTION, LEFT: ICD-10-CM

## 2024-07-17 RX ORDER — CIPROFLOXACIN AND DEXAMETHASONE 3; 1 MG/ML; MG/ML
3 SUSPENSION/ DROPS AURICULAR (OTIC) 2 TIMES DAILY
Qty: 7.5 ML | Refills: 0 | Status: SHIPPED | OUTPATIENT
Start: 2024-07-17

## 2024-07-17 NOTE — PROGRESS NOTES
PIERRE Wu  [facility] EAR NOSE & THROAT  [address]  [place] [address]  Fax [phone]  Phone [phone]      Visit Type: FOLLOW UP   Chief Complaint   Patient presents with    Ear Tube Follow-up           HPI  Kyler Koch is a 23 y.o. male who presents status post myringotomy tube insertion. The patient has had: He is still having left sided ear pressure .    Past Medical History:   Diagnosis Date    ADHD (attention deficit hyperactivity disorder)     Anxiety        Past Surgical History:   Procedure Laterality Date    ORIF WRIST FRACTURE Left     4       Family History: His family history includes No Known Problems in his father, mother, and sister.     Social History: He  reports that he has never smoked. He has never used smokeless tobacco. He reports that he does not currently use alcohol. He reports that he does not use drugs.    Home Medications:  amphetamine-dextroamphetamine, amphetamine-dextroamphetamine XR, and ciprofloxacin-dexAMETHasone    Allergies:  He has No Known Allergies.       Vital Signs:   Temp:  [98.6 °F (37 °C)] 98.6 °F (37 °C)  ENT Physical Exam  Ear  Hearing: intact;  Auricles: right auricle normal; left auricle normal;  External Mastoids: right external mastoid normal; left external mastoid normal;  Ear Canals: right ear canal normal; left ear canal normal;  Tympanic Membranes: right tympanic membrane normal; left tympanic membrane tympanostomy tube (there is blood around the tube, concerned for blockage) noted;           Result Review       RESULTS REVIEW    I have reviewed the patients old records in the chart.        Assessment & Plan  Conductive hearing loss of left ear with unrestricted hearing of right ear    Eustachian tube dysfunction, left       Orders Placed This Encounter   Procedures    Comprehensive Hearing Test      New Medications Ordered This Visit   Medications     ciprofloxacin-dexAMETHasone (CIPRODEX) 0.3-0.1 % otic suspension     Sig: Administer 3 drops into ear(s) as directed by provider 2 (Two) Times a Day.     Dispense:  7.5 mL     Refill:  0     Protect getting water in the ears. If needed, may use over the counter silicone plugs or a cotton ball coated with vasoline when bathing.  Use hairdryer on a cool setting after bathing.  For proper use of ear drops, push on tragus (cartilage in front of ear canal) after drop placement.  Return in about 3 months (around 10/17/2024) for Follow up with PIERRE Wu, with audiogram.        Electronically signed by PIERRE Wu 07/17/24 9:29 AM CDT.

## 2024-11-18 ENCOUNTER — OFFICE VISIT (OUTPATIENT)
Dept: OTOLARYNGOLOGY | Facility: CLINIC | Age: 24
End: 2024-11-18
Payer: COMMERCIAL

## 2024-11-18 ENCOUNTER — PROCEDURE VISIT (OUTPATIENT)
Dept: OTOLARYNGOLOGY | Facility: CLINIC | Age: 24
End: 2024-11-18
Payer: COMMERCIAL

## 2024-11-18 VITALS
WEIGHT: 173 LBS | SYSTOLIC BLOOD PRESSURE: 139 MMHG | BODY MASS INDEX: 25.62 KG/M2 | TEMPERATURE: 98.6 F | HEART RATE: 83 BPM | HEIGHT: 69 IN | DIASTOLIC BLOOD PRESSURE: 84 MMHG

## 2024-11-18 DIAGNOSIS — Z96.22 STATUS POST MYRINGOTOMY WITH INSERTION OF TUBE: ICD-10-CM

## 2024-11-18 DIAGNOSIS — H73.892 RETRACTION OF TYMPANIC MEMBRANE OF LEFT EAR: Primary | ICD-10-CM

## 2024-11-18 DIAGNOSIS — H90.12 CONDUCTIVE HEARING LOSS OF LEFT EAR WITH UNRESTRICTED HEARING OF RIGHT EAR: Primary | ICD-10-CM

## 2024-11-18 DIAGNOSIS — H69.92 EUSTACHIAN TUBE DYSFUNCTION, LEFT: ICD-10-CM

## 2024-11-18 PROCEDURE — 92557 COMPREHENSIVE HEARING TEST: CPT

## 2024-11-18 PROCEDURE — 92567 TYMPANOMETRY: CPT

## 2024-11-18 PROCEDURE — 1160F RVW MEDS BY RX/DR IN RCRD: CPT | Performed by: EMERGENCY MEDICINE

## 2024-11-18 PROCEDURE — 1159F MED LIST DOCD IN RCRD: CPT | Performed by: EMERGENCY MEDICINE

## 2024-11-18 PROCEDURE — 99213 OFFICE O/P EST LOW 20 MIN: CPT | Performed by: EMERGENCY MEDICINE

## 2024-11-18 NOTE — PROGRESS NOTES
PIERRE Wu  [facility] EAR NOSE & THROAT  [address]  [facility] [address]  Fax [phone]  Phone [phone]      Visit Type: FOLLOW UP   Chief Complaint   Patient presents with    Hearing Loss     3mo HL w/AUDIO           HISTORY OBTAINED FROM: patient  KACY Koch is a 24 y.o. male who presents status post myringotomy tube insertion. The patient has had: no related complaints. The patient denies pain, fever, change of hearing and otorrhea.    Past Medical History:   Diagnosis Date    ADHD (attention deficit hyperactivity disorder)     Anxiety        Past Surgical History:   Procedure Laterality Date    ORIF WRIST FRACTURE Left     4       Family History: His family history includes No Known Problems in his father, mother, and sister.     Social History: He  reports that he has never smoked. He has never used smokeless tobacco. He reports that he does not currently use alcohol. He reports that he does not use drugs.    Home Medications:  amphetamine-dextroamphetamine, amphetamine-dextroamphetamine XR, and ciprofloxacin-dexAMETHasone    Allergies:  He has No Known Allergies.       Vital Signs:   Temp:  [98.6 °F (37 °C)] 98.6 °F (37 °C)  Heart Rate:  [83] 83  BP: (139)/(84) 139/84  ENT Physical Exam  Ear  Hearing: intact;  Auricles: right auricle normal; left auricle normal;  External Mastoids: right external mastoid normal; left external mastoid normal;  Ear Canals: right ear canal normal; left ear canal normal;  Tympanic Membranes: right tympanic membrane normal;  Ear comments: Left PE tube dry and patent           Result Review       RESULTS REVIEW    I have reviewed the patients old records in the chart.   The following results/records were reviewed:   Procedure visit with Jess Montero Au.D (11/18/2024) significantly improved conductive loss left, unable to seal tymp, right normal with type A       Assessment &  Plan  Retraction of tympanic membrane of left ear    Eustachian tube dysfunction, left    Status post myringotomy with insertion of tube              Protect getting water in the ears. If needed, may use over the counter silicone plugs or a cotton ball coated with vasoline when bathing.  Use hairdryer on a cool setting after bathing.  For proper use of ear drops, push on tragus (cartilage in front of ear canal) after drop placement.  Return in about 6 months (around 5/18/2025) for Follow up with PIERRE Wu, for tube follow up.        Electronically signed by PIERRE Wu 11/18/24 3:08 PM CST.

## 2024-11-18 NOTE — PROGRESS NOTES
AUDIOMETRIC EVALUATION      Name:  Kyler Koch  :  2000  Age:  24 y.o.  Date of Evaluation:  2024       History:  Mr. Koch is seen today at the request of PIERRE Wu for a follow-up hearing evaluation. Patient has a history of conductive hearing loss of the left ear. Previous audio was on 2024.    Audiologic Information:  PETs: Left PET 2024  Other otologic surgical history: no  Aural Pressure/Fullness: Left   Otalgia: Left pain   Otorrhea: Last incidence was approximately 2 weeks ago   Tinnitus: no  Dizziness: no  Other significant history: no    **Case history obtained in office and through EMR system      EVALUATION:        RESULTS:    Otoscopic Evaluation:  Right: clear canal, tympanic membrane visualized  Left: PE tube visualized    Tympanometry (226 Hz):  Right: Type A  Left:  Could Not Seal consistent with Patent PET    Pure Tone Audiometry:    Right: hearing within normal limits   Left: Mild recovering to normal by 500Hz conductive hearing loss     Speech Audiometry:   Right: Speech Reception Threshold (SRT) was obtained at 10 dB HL  Word Recognition scores - Excellent (100)% at 50 dB, using NU-6 List 1A with 10 words  Left: Speech Reception Threshold (SRT) was obtained at 15 dB HL  Word Recognition scores - Excellent (100)% at 55 dB, using NU-6 List 2A with 10 words  SRT/PTA in good agreement bilaterally.    IMPRESSIONS:  Tympanometry showed normal middle ear pressure and static compliance, consistent with normal middle ear function for the right ear. Could not seal for the left ear, likely due to perforation or patent PE tube.     Pure tone thresholds for the right ear show hearing within normal limits, suggesting normal outer/middle ear function and normal cochlear/retrocochlear function.     Pure tone thresholds for the left ear show a mild low frequency conductive hearing loss, suggesting abnormal outer/middle ear function and normal cochlear/retrocochlear function.      Patient was counseled with regard to the findings.    Diagnosis:  1. Conductive hearing loss of left ear with unrestricted hearing of right ear    2. Status post myringotomy with insertion of tube         RECOMMENDATIONS/PLAN:  Follow-up recommendations per PIERRE Wu.  Practice good communication strategies to assist with everyday listening (eye contact with speakers, reduce background noise, encourage others to communicate clearly and slowly).  Repeat hearing evaluation per PET management or sooner if changes/concerns arise.  Discussed results and recommendations with patient. Questions were addressed and the patient was encouraged to contact our department should concerns arise.        Jess Marc, CCC-A  Doctor of Audiology

## 2024-11-25 ENCOUNTER — HOSPITAL ENCOUNTER (EMERGENCY)
Age: 24
Discharge: HOME | End: 2024-11-26
Attending: EMERGENCY MEDICINE
Payer: COMMERCIAL

## 2024-11-25 ENCOUNTER — APPOINTMENT (OUTPATIENT)
Dept: CT IMAGING | Age: 24
End: 2024-11-25
Payer: COMMERCIAL

## 2024-11-25 DIAGNOSIS — R11.2 NAUSEA AND VOMITING, UNSPECIFIED VOMITING TYPE: Primary | ICD-10-CM

## 2024-11-25 LAB
ALBUMIN SERPL-MCNC: 5.2 G/DL (ref 3.5–5.2)
ALP SERPL-CCNC: 105 U/L (ref 40–129)
ALT SERPL-CCNC: 20 U/L (ref 5–41)
ANION GAP SERPL CALCULATED.3IONS-SCNC: 15 MMOL/L (ref 7–19)
AST SERPL-CCNC: 20 U/L (ref 5–40)
BASOPHILS # BLD: 0 K/UL (ref 0–0.2)
BASOPHILS NFR BLD: 0.4 % (ref 0–1)
BILIRUB SERPL-MCNC: 0.8 MG/DL (ref 0.2–1.2)
BUN SERPL-MCNC: 9 MG/DL (ref 6–20)
CALCIUM SERPL-MCNC: 10 MG/DL (ref 8.6–10)
CHLORIDE SERPL-SCNC: 98 MMOL/L (ref 98–111)
CO2 SERPL-SCNC: 24 MMOL/L (ref 22–29)
CREAT SERPL-MCNC: 1.1 MG/DL (ref 0.7–1.2)
EOSINOPHIL # BLD: 0 K/UL (ref 0–0.6)
EOSINOPHIL NFR BLD: 0.2 % (ref 0–5)
ERYTHROCYTE [DISTWIDTH] IN BLOOD BY AUTOMATED COUNT: 11.7 % (ref 11.5–14.5)
GLUCOSE SERPL-MCNC: 95 MG/DL (ref 70–99)
HCT VFR BLD AUTO: 46.5 % (ref 42–52)
HGB BLD-MCNC: 16.6 G/DL (ref 14–18)
IMM GRANULOCYTES # BLD: 0 K/UL
LIPASE SERPL-CCNC: 38 U/L (ref 13–60)
LYMPHOCYTES # BLD: 1.8 K/UL (ref 1.1–4.5)
LYMPHOCYTES NFR BLD: 18 % (ref 20–40)
MCH RBC QN AUTO: 30.4 PG (ref 27–31)
MCHC RBC AUTO-ENTMCNC: 35.7 G/DL (ref 33–37)
MCV RBC AUTO: 85.2 FL (ref 80–94)
MONOCYTES # BLD: 0.8 K/UL (ref 0–0.9)
MONOCYTES NFR BLD: 8.4 % (ref 0–10)
NEUTROPHILS # BLD: 7.2 K/UL (ref 1.5–7.5)
NEUTS SEG NFR BLD: 72.8 % (ref 50–65)
PLATELET # BLD AUTO: 375 K/UL (ref 130–400)
PMV BLD AUTO: 9.1 FL (ref 9.4–12.4)
POTASSIUM SERPL-SCNC: 3.8 MMOL/L (ref 3.5–5)
PROT SERPL-MCNC: 8.2 G/DL (ref 6.4–8.3)
RBC # BLD AUTO: 5.46 M/UL (ref 4.7–6.1)
SODIUM SERPL-SCNC: 137 MMOL/L (ref 136–145)
WBC # BLD AUTO: 10 K/UL (ref 4.8–10.8)

## 2024-11-25 PROCEDURE — 74177 CT ABD & PELVIS W/CONTRAST: CPT

## 2024-11-25 PROCEDURE — 36415 COLL VENOUS BLD VENIPUNCTURE: CPT

## 2024-11-25 PROCEDURE — 80053 COMPREHEN METABOLIC PANEL: CPT

## 2024-11-25 PROCEDURE — 83690 ASSAY OF LIPASE: CPT

## 2024-11-25 PROCEDURE — 96374 THER/PROPH/DIAG INJ IV PUSH: CPT

## 2024-11-25 PROCEDURE — 85025 COMPLETE CBC W/AUTO DIFF WBC: CPT

## 2024-11-25 PROCEDURE — 2580000003 HC RX 258: Performed by: EMERGENCY MEDICINE

## 2024-11-25 PROCEDURE — 99285 EMERGENCY DEPT VISIT HI MDM: CPT

## 2024-11-25 PROCEDURE — 6360000002 HC RX W HCPCS: Performed by: EMERGENCY MEDICINE

## 2024-11-25 PROCEDURE — 96361 HYDRATE IV INFUSION ADD-ON: CPT

## 2024-11-25 PROCEDURE — 6360000004 HC RX CONTRAST MEDICATION: Performed by: EMERGENCY MEDICINE

## 2024-11-25 RX ORDER — IOPAMIDOL 755 MG/ML
70 INJECTION, SOLUTION INTRAVASCULAR
Status: COMPLETED | OUTPATIENT
Start: 2024-11-25 | End: 2024-11-25

## 2024-11-25 RX ORDER — ONDANSETRON 2 MG/ML
4 INJECTION INTRAMUSCULAR; INTRAVENOUS ONCE
Status: COMPLETED | OUTPATIENT
Start: 2024-11-25 | End: 2024-11-25

## 2024-11-25 RX ORDER — SODIUM CHLORIDE, SODIUM LACTATE, POTASSIUM CHLORIDE, AND CALCIUM CHLORIDE .6; .31; .03; .02 G/100ML; G/100ML; G/100ML; G/100ML
1000 INJECTION, SOLUTION INTRAVENOUS ONCE
Status: COMPLETED | OUTPATIENT
Start: 2024-11-25 | End: 2024-11-26

## 2024-11-25 RX ADMIN — ONDANSETRON 4 MG: 2 INJECTION INTRAMUSCULAR; INTRAVENOUS at 22:54

## 2024-11-25 RX ADMIN — SODIUM CHLORIDE, POTASSIUM CHLORIDE, SODIUM LACTATE AND CALCIUM CHLORIDE 1000 ML: 600; 310; 30; 20 INJECTION, SOLUTION INTRAVENOUS at 22:53

## 2024-11-25 RX ADMIN — IOPAMIDOL 70 ML: 755 INJECTION, SOLUTION INTRAVENOUS at 23:35

## 2024-11-26 VITALS
WEIGHT: 175 LBS | DIASTOLIC BLOOD PRESSURE: 72 MMHG | OXYGEN SATURATION: 98 % | SYSTOLIC BLOOD PRESSURE: 101 MMHG | HEIGHT: 69 IN | BODY MASS INDEX: 25.92 KG/M2 | HEART RATE: 76 BPM | RESPIRATION RATE: 16 BRPM | TEMPERATURE: 97.8 F

## 2024-11-26 LAB
BILIRUB UR QL STRIP: NEGATIVE
CLARITY UR: CLEAR
COLOR UR: YELLOW
GLUCOSE UR STRIP.AUTO-MCNC: NEGATIVE MG/DL
HGB UR STRIP.AUTO-MCNC: NEGATIVE MG/L
KETONES UR STRIP.AUTO-MCNC: 15 MG/DL
LEUKOCYTE ESTERASE UR QL STRIP.AUTO: NEGATIVE
NITRITE UR QL STRIP.AUTO: NEGATIVE
PH UR STRIP.AUTO: 5.5 [PH] (ref 5–8)
PROT UR STRIP.AUTO-MCNC: NEGATIVE MG/DL
SP GR UR STRIP.AUTO: 1.04 (ref 1–1.03)
UROBILINOGEN UR STRIP.AUTO-MCNC: 0.2 E.U./DL

## 2024-11-26 PROCEDURE — 81003 URINALYSIS AUTO W/O SCOPE: CPT

## 2024-11-26 RX ORDER — ONDANSETRON 4 MG/1
4 TABLET, FILM COATED ORAL 3 TIMES DAILY PRN
Qty: 15 TABLET | Refills: 0 | Status: SHIPPED | OUTPATIENT
Start: 2024-11-26

## 2024-12-05 ASSESSMENT — ENCOUNTER SYMPTOMS
DIARRHEA: 0
ABDOMINAL PAIN: 1
VOMITING: 1
SHORTNESS OF BREATH: 0
BLOOD IN STOOL: 0
WHEEZING: 0
NAUSEA: 1

## 2025-07-01 ENCOUNTER — TELEPHONE (OUTPATIENT)
Dept: OTOLARYNGOLOGY | Facility: CLINIC | Age: 25
End: 2025-07-01
Payer: COMMERCIAL

## 2025-07-01 NOTE — TELEPHONE ENCOUNTER
Provider: VERONIKA PLUNKETT    Caller: PT    Relationship to Patient: SELF    Phone Number: [phone]    Reason for Call: PT HAS RECENTLY MOVED TO [place] AND WOULD LIKE TO CONTINUE CARE WITH .  COULD YOU PLEASE SEND IN A REFERRAL FOR TRANSFER OF CARE TO [facility] FOR CONTINUITY OF CARE.  HE IS WILLING TO DRIVE TO [facility].

## 2025-07-01 NOTE — TELEPHONE ENCOUNTER
Provider: DIANDRA HARDIN    Caller: YANNI LY    Relationship to Patient: SELF    Reason for Call: PATIENT HAS MOVED TO [place]. HE IS ASKING FOR A REFERRAL TO ENT [facility]. THE TUBE IN HIS LEFT EAR IS READY TO COME OUT. HE IS HAVING ISSUES WITH THAT WOULD LIKE TO BE SEEN SOON.   PLEASE REACH OUT TO THE PATIENT ONCE THE REFERRAL HAS BEEN SENT SO HE IS AWARE.    When was the patient last seen: 11-18-24

## 2025-07-02 DIAGNOSIS — H66.92 LEFT CHRONIC OTITIS MEDIA: ICD-10-CM

## 2025-07-02 DIAGNOSIS — R04.0 EPISTAXIS: ICD-10-CM

## 2025-07-02 DIAGNOSIS — H90.12 CONDUCTIVE HEARING LOSS OF LEFT EAR WITH UNRESTRICTED HEARING OF RIGHT EAR: ICD-10-CM

## 2025-07-02 DIAGNOSIS — H73.892 RETRACTION OF TYMPANIC MEMBRANE OF LEFT EAR: Primary | ICD-10-CM

## 2025-07-02 DIAGNOSIS — Z96.22 STATUS POST MYRINGOTOMY WITH INSERTION OF TUBE: ICD-10-CM

## 2025-07-02 DIAGNOSIS — H69.92 EUSTACHIAN TUBE DYSFUNCTION, LEFT: ICD-10-CM
